# Patient Record
(demographics unavailable — no encounter records)

---

## 2025-01-29 NOTE — REASON FOR VISIT
[Follow-Up Visit] : a follow-up visit for [FreeTextEntry2] : Follow-up for left elbow ulnar neuritis

## 2025-01-29 NOTE — HISTORY OF PRESENT ILLNESS
[de-identified] : Still having numbness in his left pinky and ring finger.  We were scheduled for surgery but he decided to postpone this.  He is still here with the same symptoms he had before which include numbness and tingling in his fingers as well as slight motor dysfunction

## 2025-01-29 NOTE — PHYSICAL EXAM
[de-identified] : Well-appearing male in no acute distress.  Alert and oriented x 3.  Appears his stated age.  Normal mood and affect.  He has numbness and tingling in the pinky and ring fingers.  He has decreased 2 point sensation this area.  Positive Tinel's at the elbow.  Ulnar nerve does not subluxate.  Mild pain with range of motion of his neck.  He is also noted to have thoracic spine pain recently.  He has some pain with rotational maneuvers of his thoracic spine. [de-identified] : I reviewed an MRI of his thoracic spine as well as x-rays of his thoracic spine.  These demonstrate mild degenerative changes with mild facet arthropathy.  No acute nerve compression was seen

## 2025-01-29 NOTE — DISCUSSION/SUMMARY
[de-identified] : I had a long discussion with the patient.  We discussed operative and nonoperative treatment options.  Right now he would like to proceed with operative intervention.  This would level left elbow ulnar nerve transposition.  For his low back and thoracic spine we will get him started in some physical therapy.  Will see how he feels and this.  If he continues to have discomfort we will have him see pain management doctor for consideration of an epidural.  We also discussed use of anti-inflammatory medications as well as the risks and benefits.  All of his questions were answered, he agrees with the plan the risks and benefits of the procedure were explained to the patient at great length which include, but are not limited to, bleeding, infection, blood clots, permanent or transient neurapraxia, need for further surgery, stiffness, failure, continued pain, continued mechanical symptoms, non-union, malunion, need for hardware removal, and death. The patient understood these risks and still wished to proceed with surgery.

## 2025-01-29 NOTE — HISTORY OF PRESENT ILLNESS
[de-identified] : Still having numbness in his left pinky and ring finger.  We were scheduled for surgery but he decided to postpone this.  He is still here with the same symptoms he had before which include numbness and tingling in his fingers as well as slight motor dysfunction

## 2025-01-29 NOTE — DISCUSSION/SUMMARY
[de-identified] : I had a long discussion with the patient.  We discussed operative and nonoperative treatment options.  Right now he would like to proceed with operative intervention.  This would level left elbow ulnar nerve transposition.  For his low back and thoracic spine we will get him started in some physical therapy.  Will see how he feels and this.  If he continues to have discomfort we will have him see pain management doctor for consideration of an epidural.  We also discussed use of anti-inflammatory medications as well as the risks and benefits.  All of his questions were answered, he agrees with the plan the risks and benefits of the procedure were explained to the patient at great length which include, but are not limited to, bleeding, infection, blood clots, permanent or transient neurapraxia, need for further surgery, stiffness, failure, continued pain, continued mechanical symptoms, non-union, malunion, need for hardware removal, and death. The patient understood these risks and still wished to proceed with surgery.

## 2025-01-29 NOTE — PHYSICAL EXAM
[de-identified] : Well-appearing male in no acute distress.  Alert and oriented x 3.  Appears his stated age.  Normal mood and affect.  He has numbness and tingling in the pinky and ring fingers.  He has decreased 2 point sensation this area.  Positive Tinel's at the elbow.  Ulnar nerve does not subluxate.  Mild pain with range of motion of his neck.  He is also noted to have thoracic spine pain recently.  He has some pain with rotational maneuvers of his thoracic spine. [de-identified] : I reviewed an MRI of his thoracic spine as well as x-rays of his thoracic spine.  These demonstrate mild degenerative changes with mild facet arthropathy.  No acute nerve compression was seen

## 2025-02-26 NOTE — ASSESSMENT
[FreeTextEntry1] : Chief Complaint: Follow up status post left elbow ulnar nerve transposition performed on 2/11/2025   HPI: They have been doing great since surgery. Pain is controlled. Denies any fevers/chills/night sweats or any drainage from any incisions.   Examination: Incision is healing well with no evidence of any erythema or drainage. They have no pain with gentle motion of the elbow and no pain with gentle motion of the shoulder. Neurovascularly intact in their upper extremity. No pain with range of motion of their shoulder or wrist.   Assessment: Status post left elbow ulnar nerve transposition performed on 2/11/2025   Treatment Notes: I had a long discussion with the patient. I think they are doing great. I want them to continue formal physical therapy. I will see them back in the office in 4 weeks for repeat evaluation. They will avoid any lifting with the elbow until I see them back. All of their questions were answered, they agree with the plan. We also discussed use of anti-inflammatory medications as well as their risks and benefits. [ ]

## 2025-04-09 NOTE — ASSESSMENT
[FreeTextEntry1] : Chief Complaint: Follow up status post left elbow ulnar nerve transposition performed on 2/11/25   HPI: They have been doing great since I last saw them. Pain is controlled. Therapy is going well. Feels like everything is moving in the right direction.  Still with some of his preoperative symptoms these are slightly better   Examination: Incision is healing well with no evidence of any erythema or drainage. They have full range of motion of the shoulder. Full pronation and supination. Neurovascularly intact in their upper extremity. No pain with range of motion of their shoulder or wrist. They have essentially full range of motion of their elbow today   Assessment: Status post left elbow ulnar nerve transposition performed on 2/11/25   Treatment Notes: I had a long discussion with the patient. I think they are doing great. I want them to continue with physical therapy. I will see them back in the office in 6-8 weeks for repeat evaluation. They will avoid any heavy lifting with the elbow until I see them back. All of their questions were answered, they agree with the plan. We also discussed use of anti-inflammatory medications as well as their risks and benefits. [ ]

## 2025-06-11 NOTE — ASSESSMENT
[FreeTextEntry1] : Chief Complaint: Follow up status post left elbow ulnar nerve transposition performed on 2/11/25   HPI: They have been doing great since I last saw them. Pain is controlled. Therapy is going well. Feels like everything is continuing to move in the right direction.   Examination: Incision is healing well with no evidence of erythema or drainage. They have no pain with motion of the shoulder. Neurovascularly intact in their upper extremity. No pain with range of motion of their neck or wrist. They have full range of motion of their elbow today   Assessment: Status post left elbow ulnar nerve transposition performed on 2/11/25   Treatment Notes: I had a long discussion with the patient. I think they are doing great. I want them to continue with physical therapy. I will see them back in the office in 3 months for repeat evaluation. All of their questions were answered, they agree with the plan. We also discussed use of anti-inflammatory medications as well as their risks and benefits. [ ]